# Patient Record
Sex: FEMALE | Race: WHITE | NOT HISPANIC OR LATINO | Employment: PART TIME | URBAN - METROPOLITAN AREA
[De-identification: names, ages, dates, MRNs, and addresses within clinical notes are randomized per-mention and may not be internally consistent; named-entity substitution may affect disease eponyms.]

---

## 2022-03-21 ENCOUNTER — OFFICE VISIT (OUTPATIENT)
Dept: SURGERY | Facility: CLINIC | Age: 52
End: 2022-03-21
Payer: COMMERCIAL

## 2022-03-21 VITALS
HEIGHT: 58 IN | OXYGEN SATURATION: 96 % | BODY MASS INDEX: 25.19 KG/M2 | RESPIRATION RATE: 16 BRPM | DIASTOLIC BLOOD PRESSURE: 60 MMHG | SYSTOLIC BLOOD PRESSURE: 100 MMHG | WEIGHT: 120 LBS | HEART RATE: 92 BPM

## 2022-03-21 DIAGNOSIS — M62.08 RECTUS DIASTASIS: ICD-10-CM

## 2022-03-21 DIAGNOSIS — K42.9 UMBILICAL HERNIA WITHOUT OBSTRUCTION AND WITHOUT GANGRENE: Primary | ICD-10-CM

## 2022-03-21 PROCEDURE — 99204 OFFICE O/P NEW MOD 45 MIN: CPT | Performed by: SURGERY

## 2022-03-21 RX ORDER — GUANFACINE 1 MG/1
1 TABLET ORAL 3 TIMES DAILY PRN
COMMUNITY

## 2022-03-21 RX ORDER — DEXTROAMPHETAMINE SACCHARATE, AMPHETAMINE ASPARTATE, DEXTROAMPHETAMINE SULFATE AND AMPHETAMINE SULFATE 1.25; 1.25; 1.25; 1.25 MG/1; MG/1; MG/1; MG/1
5 TABLET ORAL DAILY
COMMUNITY

## 2022-03-21 RX ORDER — LAMOTRIGINE 200 MG/1
200 TABLET ORAL DAILY
COMMUNITY

## 2022-03-21 RX ORDER — VENLAFAXINE HYDROCHLORIDE 150 MG/1
150 CAPSULE, EXTENDED RELEASE ORAL DAILY
COMMUNITY

## 2022-03-21 NOTE — PROGRESS NOTES
[facility]  New Patient Office Visit    Assessment:   Kristen Jason is a 51 year old female with primary, reducible umbilical hernia with a defect size of approximately 1-1.5 cm. She also has a ~ 7 cm rectus diastasis and will be undergoing abdominoplasty with Dr. Malik later this spring/early summer.     Plan:    We will schedule an open umbilical hernia repair at the time of her abdominoplasty with Dr. Malik.     Will need preop H&P by her PCP.    We have discussed observation, reduction techniques and importance, incarceration and strangulation signs, symptoms and importance as well as need to seek emergency treatment.      We have had a detailed discussion regarding the nature of umbilical hernias, and that watchful waiting for small asymptomatic hernias is acceptable, but that in general they do tend to enlarge or become symptomatic over time. The patient has been experiencing bothersome symptoms and wishes to proceed with repair.  Surgery, indications, alternatives, risks, benefits, incisions, scarring, anesthesia, recovery, mesh, infection, bleeding, numbness, hernia recurrence, lifting and activity limitations after surgery.  All questions have been answered to the best of my ability. In particular, we discussed the risk of umbilical skin necrosis during simultaneous abdominoplasty/umbilical hernia repair    Karolyn Turner MD    HPI:  Kristen Jason is a 51 year old female who presents for evaluation of a bulge in the umbilical region.   She first noticed it 1 year ago. It had never bothered her, but her PCP pointed it out to her during a physical exam.     She has a rectus diastasis and will be undergoing an abdminoplasty soon to correct this. Her plastic surgeon, Dr. Malik, has referred her for evaluation of her umbilical hernia for consideration of simultaneous hernia repair.    Prior incarceration:  No   Nausea/vomitting/bloating:  No   Bulge/mass:  Yes    Previous herniorrhaphy:  No  "    Constipation: No  Cough: No  Diabetes: No  Current smoker: No    Heavy lifting > 20 lb: No    Past Medical History:  ADHD  Anxiety    Current Outpatient Medications   Medication     amphetamine-dextroamphetamine (ADDERALL) 5 MG tablet     guanFACINE (TENEX) 1 MG tablet     lamoTRIgine (LAMICTAL) 200 MG tablet     venlafaxine (EFFEXOR-XR) 150 MG 24 hr capsule     No current facility-administered medications for this visit.        Past Surgical History:  No past surgical history on file.     Social History:  No tobacco use  Limited alcohol use  Lives in [place] with her spouse and 2 sons (age 8 and 12)    Family History:  History of breast cancer in 2 paternal aunts and bladder cancer in maternal grandfather. No Family history of bleeding or clotting disorders or reactions to anesthesia    ROS:  The 10 point review of systems is negative other than noted in the HPI and above.    PE:    Vitals - /60   Pulse 92   Resp 16   Ht 1.473 m (4' 10\")   Wt 54.4 kg (120 lb)   SpO2 96%   BMI 25.08 kg/m    BMI - Body mass index is 25.08 kg/m .  General - Well-developed, well-nourished, patient able to get up on table without difficulty.  HEENT - Mucous membranes moist.  Sclera are nonicteric.  Lymph -  No inguinal lymphadenopathy or masses   Respiratory - regular and non labored  CV - regular pulse  Abdomen - abdomen is soft without significant tenderness, masses, organomegaly or guarding,   Hernia - Upon standing there is a small bulge in the umbilical region with flattening of the umbilical stalk which is present spontaneously. The hernia is manually reducible. There is no overlying skin change. The defect feels to be ~ 1-1.5 cm.  Extremities - without edema  Psych - mood and affect are appropriate  Neurologic - alert, speech is clear, moves all extremities with good strength  Integument - without lesions, rashes, or jaundice    This note may have been created using voice recognition software. Undetected word " substitutions or other errors may have occurred.     35 minutes total time spent on the date of this encounter doing: chart review, review of test results, patient visit, physical exam, education, counseling, developing plan of care, and documenting.    Karolyn Turner MD  03/21/22 6:40 PM     Please route or send letter to:  Referring Provider

## 2022-03-22 ENCOUNTER — TELEPHONE (OUTPATIENT)
Dept: SURGERY | Facility: CLINIC | Age: 52
End: 2022-03-22
Payer: COMMERCIAL

## 2022-03-22 NOTE — TELEPHONE ENCOUNTER
Type of surgery: UMBILICAL HERNIA REPAIR      Location of surgery: [facility], [place]   Date and time of surgery: 5/31/2022 @ 7:00   Surgeon: Karolyn Turner MD   Pre-Op Appt Date: PATIENT TO SCHEDULE    Post-Op Appt Date: PATIENT TO SCHEDULE     Packet sent out: Yes  Pre-cert/Authorization completed:  Not Applicable  Date: 3/22/2022         [facility] [place] COORDINATED CASE-  UMBILICAL HERNIA REPAIR     GENERAL PT INST TO HAVE H&P WITH PCP  30 MIN REQ PA ASSIST [facility] NMS  DR MACHADO TO START WITH ABDOMINOPLASTY 1 HOUR , DR TURNER WILL DO HERNIA REPAIR 30 MIN ,  AND DR MACHADO WILL FINISH 120 MIN  NMS

## 2022-03-22 NOTE — LETTER
[facility]    [address], [address]  [place], [place] [address]  Phone [phone]  Fax [phone]    [address]  [facility] [place] [address]  Phone [phone]  Fax [phone]    [email or web address]     Kristen Jason     You have been scheduled for a COVID-19 testing appointment at [facility].    3/27/2022 at 10:00 AM     The clinic is located at:  [address]  [place]  [address]         Please wear a mask or face covering to the testing site.  Have your ID out and ready to show staff when you arrive.    Following your testing, you will be required to self-isolate until your surgery.  If you need a note for your employer due to this, please let us know.

## 2022-03-22 NOTE — LETTER
[facility]    [address], [address]  [place], [place] [address]  Phone [phone]  Fax [phone] [address], [address]  [facility] [place] [address]  Phone [phone]  Fax [phone]    [email or web address]   March 22, 2022    Kristen Cuenca Casie  [address] [address]    We realize with scheduling surgery, one of your first questions is, how much will this cost?  Below we have provided you with the information you will need to receive an estimate for your surgery.    You are scheduled for the following procedure:  UMBILICAL HERNIA REPAIR         Surgeon:  Karolyn Turner MD      Physician Assistant:  Yes      Please make sure to have your insurance card available at the time of calling.    Surgeon & Physician Assistant charges and facility charges for [facility], [facility] or [facility]:    Consumer Price Line at [phone]   -  It is important to note that there may be a Physician Assistant assisting with your surgery.  Please be sure to mention this when calling for the estimate.      If you prefer, you can also request a price estimate online by completing the secure form at:  [email or web address]    Facility Charges at [facility], [facility] or [facility]:  [facility] at [phone]  [facility] at [phone]  [facility] at [phone] or [phone]    Anesthesiologist Charges:  [facility] at [phone]    [facility] - Nurse Anesthetist Charges:  [facility] at [phone]